# Patient Record
Sex: MALE | Race: BLACK OR AFRICAN AMERICAN | Employment: FULL TIME | URBAN - METROPOLITAN AREA
[De-identification: names, ages, dates, MRNs, and addresses within clinical notes are randomized per-mention and may not be internally consistent; named-entity substitution may affect disease eponyms.]

---

## 2024-01-31 ENCOUNTER — OFFICE VISIT (OUTPATIENT)
Dept: WOUND CARE | Facility: HOSPITAL | Age: 24
End: 2024-01-31
Attending: INTERNAL MEDICINE
Payer: COMMERCIAL

## 2024-01-31 VITALS
WEIGHT: 137 LBS | TEMPERATURE: 98 F | HEART RATE: 71 BPM | DIASTOLIC BLOOD PRESSURE: 76 MMHG | HEIGHT: 71 IN | SYSTOLIC BLOOD PRESSURE: 117 MMHG | BODY MASS INDEX: 19.18 KG/M2
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
## 2024-01-31 DIAGNOSIS — S31.109A OPEN WOUND OF ABDOMEN, INITIAL ENCOUNTER: Primary | ICD-10-CM

## 2024-01-31 DIAGNOSIS — T81.89XA NONHEALING SURGICAL WOUND, INITIAL ENCOUNTER: ICD-10-CM

## 2024-01-31 DIAGNOSIS — S21.109A OPEN WOUND OF CHEST WALL, UNSPECIFIED LATERALITY, INITIAL ENCOUNTER: ICD-10-CM

## 2024-01-31 PROCEDURE — 97597 DBRDMT OPN WND 1ST 20 CM/<: CPT | Performed by: INTERNAL MEDICINE

## 2024-01-31 PROCEDURE — 99214 OFFICE O/P EST MOD 30 MIN: CPT

## 2024-01-31 NOTE — PROGRESS NOTES
Weekly Wound Education Note    Teaching Provided To: Patient  Training Topics: Cleasing and general instructions;Dressing;Discharge instructions  Training Method: Explain/Verbal  Training Response: Patient responds and understands;Reinforcement needed        Notes: Initial visitL chest and abdominal wound. Honey gel, silver alginate, bordered gauze. PT/family requesting HH - orders faxed to [facility].

## 2024-01-31 NOTE — PATIENT INSTRUCTIONS
Wound Cleaning and Dressings:    Wash your hands with soap and water. Always wear gloves while changing dressings. Donot touch wound / jaaynt-wound skin with un-gloved hands. Remove old dressing, discard and place into trash.    DRESSINGS: honey gel / silver alginate.   Change dressing daily.    Miscellaneous Instructions:  Supplement with a daily multivitamin   Increase protein intake / consider protein supplements - see below    DIETARY MODIFICATIONS TO HELP WITH WOUND HEALING:    Protein: Meats, beans, eggs, milk and yogurt particularly Greek yogurt), tofu, soy nuts, soy protein products    Vitamin C: Citrus fruits and juices, strawberries, tomatoes, tomato juice, peppers, baked potatoes, spinach, broccoli, cauliflower, [place] sprouts, cabbage    Vitamin A: Dark green, leafy vegetables, orange or yellow vegetables, cantaloupe, fortified dairy products, liver, fortified cereals    Zinc: Fortified cereals, red meats, seafood    Consider Leon by [employer] (These are essential branch chain amino acids that help with tissue building and wound healing) and take 2 packets/day. you can order online at [employer] or [employer]    ADDITIONAL REMINDERS:    The treatment plan has been discussed at length with you and your provider. Follow all instructions carefully, it is very important. If you do not follow all instructions, you are at  risk of your wound not healing, infection, possible loss of limb and even end of life.  Please call the clinic during regular business hours ( 7:30 AM - 5:30 PM) if you notice increased bleeding, redness, warmth, pain or pus like drainage or start running a fever greater than 100.3.    For after hour emergencies, please call your primary physician or go to the nearest emergency room.

## 2024-01-31 NOTE — PROGRESS NOTES
Patient ID: Viji Quezada is a 23 year old male.    Debridement Traumatic Lower Sternum   Wound 01/31/24 #1- Chest Sternum Lower    Performed by: Kaleigh Dean MD  Authorized by: Kaleigh Dean MD      Consent   Consent obtained? verbal  Consent given by: patient  Risks discussed? procedural risks discussed    Debridement Details  Performed by: physician  Debridement type: conservative sharp  Pain control: lidocaine 4%  Pain control administration type: topical    Pre-debridement measurements  Length (cm): 1.5  Width (cm): 2.5  Depth (cm): 0.3  Surface Area (cm^2): 3.75    Post-debridement measurements  Length (cm): 1.5  Width (cm): 2.5  Depth (cm): 0.3  Percent debrided: 50%  Surface Area (cm^2): 3.75  Area Debrided (cm^2): 1.88  Volume (cm^3): 1.13    Devitalized tissue debrided: biofilm and slough  Instrument(s) utilized: curette  Bleeding: small  Hemostasis obtained with: pressure  Procedural pain (0-10): 4  Post-procedural pain: 0   Response to treatment: procedure was tolerated well
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
## 2024-02-07 ENCOUNTER — OFFICE VISIT (OUTPATIENT)
Dept: WOUND CARE | Facility: HOSPITAL | Age: 24
End: 2024-02-07
Attending: INTERNAL MEDICINE
Payer: COMMERCIAL

## 2024-02-07 VITALS
DIASTOLIC BLOOD PRESSURE: 62 MMHG | SYSTOLIC BLOOD PRESSURE: 118 MMHG | RESPIRATION RATE: 14 BRPM | TEMPERATURE: 99 F | HEART RATE: 71 BPM

## 2024-02-07 DIAGNOSIS — S31.109D OPEN WOUND OF ABDOMEN, SUBSEQUENT ENCOUNTER: Primary | ICD-10-CM

## 2024-02-07 DIAGNOSIS — T81.89XD NON-HEALING SURGICAL WOUND, SUBSEQUENT ENCOUNTER: ICD-10-CM

## 2024-02-07 DIAGNOSIS — S21.102D OPEN WOUND OF LEFT CHEST WALL, SUBSEQUENT ENCOUNTER: ICD-10-CM

## 2024-02-07 PROCEDURE — 99213 OFFICE O/P EST LOW 20 MIN: CPT

## 2024-02-07 NOTE — PROGRESS NOTES
[facility] PROGRESS NOTE  JUDITH ROBINS MD  2/7/2024    Chief Complaint:   Chief Complaint   Patient presents with    Wound Care     Follow up for abdominal wounds. No complains at this time.        HPI:   Jess Quezada is a 23 year old male coming in for a follow-up visit.    HPI    Wound much improved.  No s/o infection.   Healing as expected  More granulation    Review of Systems  Negative except HPI   Denies chest pain / SOB / palpitations  Denies fever.     Allergies  Not on File    Current Meds:  No current outpatient medications on file.         EXAM:   Objective   Objective    Physical Exam    Vital Signs  Vitals:    02/07/24 0818   BP: 118/62   Pulse: 71   Resp: 14   Temp: 98.5 °F (36.9 °C)       Wound Assessment  Wound 01/31/24 #1- Chest Sternum Lower (Active)   Wound Image   02/07/24 0823   Drainage Amount Small 02/07/24 0823   Drainage Description Yellow;Serous 02/07/24 0823   Wound Length (cm) 1 cm 02/07/24 0823   Wound Width (cm) 2.2 cm 02/07/24 0823   Wound Surface Area (cm^2) 2.2 cm^2 02/07/24 0823   Wound Depth (cm) 0.1 cm 02/07/24 0823   Wound Volume (cm^3) 0.22 cm^3 02/07/24 0823   Wound Healing % 80 02/07/24 0823   Margins Well-defined edges 02/07/24 0823   Non-staged Wound Description Full thickness 02/07/24 0823   Emely-wound Assessment Edema;Induration 02/07/24 0823   Wound Granulation Tissue Red;Spongy 02/07/24 0823   Wound Bed Granulation (%) 100 % 02/07/24 0823   Wound Bed Slough (%) 25 % 01/31/24 1505   Wound Odor None 02/07/24 0823       Wound 01/31/24 #2- Abdomen Abdomen Medial;Upper (Active)   Wound Image   02/07/24 0823   Drainage Amount Small 02/07/24 0823   Drainage Description Serous;Yellow 02/07/24 0823   Wound Length (cm) 9.3 cm 02/07/24 0823   Wound Width (cm) 0.5 cm 02/07/24 0823   Wound Surface Area (cm^2) 4.65 cm^2 02/07/24 0823   Wound Depth (cm) 0.1 cm 02/07/24 0823   Wound Volume (cm^3) 0.465 cm^3 02/07/24 0823   Wound Healing % 89 02/07/24 0823    Margins Well-defined edges 02/07/24 0823   Non-staged Wound Description Full thickness 02/07/24 0823   Jayant-wound Assessment Dry;Edema 02/07/24 0823   Wound Bed Granulation (%) 70 % 02/07/24 0823   Wound Bed Epithelium (%) 20 % 02/07/24 0823   Wound Bed Slough (%) 10 % 02/07/24 0823   Wound Odor None 02/07/24 0823   Shape Bridged 02/07/24 0823           ASSESSMENT AND PLAN:     Assessment   Assessment    Encounter Diagnosis  1. Open wound of abdomen, subsequent encounter    2. Open wound of left chest wall, subsequent encounter    3. Non-healing surgical wound, subsequent encounter        Problem List  There is no problem list on file for this patient.        MANAGEMENT    Dc honey gel  Start collagen and HF transfer  Protein shakes.   Avoid activities that raise intra abdominal pressure.     PROCEDURES:    Mechanical debridement of wound base with moist gauze    Patient Instructions     Wound Cleaning and Dressings:    Wash your hands with soap and water. Always wear gloves while changing dressings. Donot touch wound / jayant-wound skin with un-gloved hands. Remove old dressing, discard and place into trash.    DRESSINGS: collagen / HF transfer  Change dressing every other day MWF    Miscellaneous Instructions:  Supplement with a daily multivitamin   Increase protein intake / consider protein supplements - see below    DIETARY MODIFICATIONS TO HELP WITH WOUND HEALING:    Protein: Meats, beans, eggs, milk and yogurt particularly Greek yogurt), tofu, soy nuts, soy protein products    Vitamin C: Citrus fruits and juices, strawberries, tomatoes, tomato juice, peppers, baked potatoes, spinach, broccoli, cauliflower, [place] sprouts, cabbage    Vitamin A: Dark green, leafy vegetables, orange or yellow vegetables, cantaloupe, fortified dairy products, liver, fortified cereals    Zinc: Fortified cereals, red meats, seafood    Consider Leon by [employer] (These are essential branch chain amino acids that help with tissue  building and wound healing) and take 2 packets/day. you can order online at [employer] or "[employer]    ADDITIONAL REMINDERS:    The treatment plan has been discussed at length with you and your provider. Follow all instructions carefully, it is very important. If you do not follow all instructions, you are at  risk of your wound not healing, infection, possible loss of limb and even end of life.  Please call the clinic during regular business hours ( 7:30 AM - 5:30 PM) if you notice increased bleeding, redness, warmth, pain or pus like drainage or start running a fever greater than 100.3.    For after hour emergencies, please call your primary physician or go to the nearest emergency room.      Orders  No orders of the defined types were placed in this encounter.      Meds & Refills for this Visit:  Requested Prescriptions      No prescriptions requested or ordered in this encounter         Patient/Caregiver Education: There are no barriers to learning. Medical education for above diagnosis given.   Answered all questions.    Outcome: Patient verbalizes understanding. Patient is notified to call with any questions, complications, allergies, or worsening or changing symptoms.  Patient is to call with any side effects or complications as a result of the treatments today.      DOCUMENTATION OF TIME SPENT: Code selection for this visit was based on time spent : 35 min on date of service in preparing to see the patient, obtaining and/or reviewing separately obtained history, performing a medically appropriate examination, counseling and educating the patient/family/caregiver, ordering medications or testing, referring and communicating with other healthcare providers, documenting clinical information in the E HR, independently interpreting results and communicating results to the patient/family/caregiver and care coordination with the patient's other providers.    Followup: Return in about 5 days (around 2/12/2024) for Wound  followup.      Note to Patient:  The 21st Century Cures Act makes medical notes like these available to patients in the interest of transparency. However, be advised this is a medical document and is intended as iflr-ch-pqfy communication; it is written in medical language and may appear blunt, direct, or contain abbreviations or verbiage that are unfamiliar. Medical documents are intended to carry relevant information, facts as evident, and the clinical opinion of the practitioner.    Also, please note that this report has been produced using speech recognition software and may contain errors related to that system including, but not limited to, errors in grammar, punctuation, and spelling, as well as words and phrases that possibly may have been recognized inappropriately.  If there are any questions or concerns, contact the dictating provider for clarification.      Kaleigh Melgar MD  2/7/2024  8:43 AM

## 2024-02-07 NOTE — PROGRESS NOTES
Weekly Wound Education Note    Teaching Provided To: Patient;Family  Training Topics: Cleasing and general instructions;Dressing;Discharge instructions  Training Method: Explain/Verbal  Training Response: Patient responds and understands;Reinforcement needed        Notes: Improving. Switched to endoform, hydrofera transfer, bordered gauze. Pt states HH did not go, father did dressing changes daily. Educated them on new dressings and provided with supplies. Provider recommended abdominal binder, provider with small abd. binder - pt states \"it feels good.\"

## 2024-02-07 NOTE — PATIENT INSTRUCTIONS
Wound Cleaning and Dressings:    Wash your hands with soap and water. Always wear gloves while changing dressings. Donot touch wound / jayant-wound skin with un-gloved hands. Remove old dressing, discard and place into trash.    DRESSINGS: collagen / HF transfer  Change dressing every other day MWF    Miscellaneous Instructions:  Supplement with a daily multivitamin   Increase protein intake / consider protein supplements - see below    DIETARY MODIFICATIONS TO HELP WITH WOUND HEALING:    Protein: Meats, beans, eggs, milk and yogurt particularly Greek yogurt), tofu, soy nuts, soy protein products    Vitamin C: Citrus fruits and juices, strawberries, tomatoes, tomato juice, peppers, baked potatoes, spinach, broccoli, cauliflower, [place] sprouts, cabbage    Vitamin A: Dark green, leafy vegetables, orange or yellow vegetables, cantaloupe, fortified dairy products, liver, fortified cereals    Zinc: Fortified cereals, red meats, seafood    Consider Leon by [employer] (These are essential branch chain amino acids that help with tissue building and wound healing) and take 2 packets/day. you can order online at [employer] or [facility]    ADDITIONAL REMINDERS:    The treatment plan has been discussed at length with you and your provider. Follow all instructions carefully, it is very important. If you do not follow all instructions, you are at  risk of your wound not healing, infection, possible loss of limb and even end of life.  Please call the clinic during regular business hours ( 7:30 AM - 5:30 PM) if you notice increased bleeding, redness, warmth, pain or pus like drainage or start running a fever greater than 100.3.    For after hour emergencies, please call your primary physician or go to the nearest emergency room.

## 2024-02-12 ENCOUNTER — OFFICE VISIT (OUTPATIENT)
Dept: WOUND CARE | Facility: HOSPITAL | Age: 24
End: 2024-02-12
Attending: INTERNAL MEDICINE
Payer: COMMERCIAL

## 2024-02-12 VITALS
DIASTOLIC BLOOD PRESSURE: 78 MMHG | HEART RATE: 69 BPM | SYSTOLIC BLOOD PRESSURE: 117 MMHG | RESPIRATION RATE: 16 BRPM | TEMPERATURE: 98 F

## 2024-02-12 DIAGNOSIS — S21.102D OPEN WOUND OF LEFT CHEST WALL, SUBSEQUENT ENCOUNTER: ICD-10-CM

## 2024-02-12 DIAGNOSIS — T81.89XD NON-HEALING SURGICAL WOUND, SUBSEQUENT ENCOUNTER: ICD-10-CM

## 2024-02-12 DIAGNOSIS — S31.109D OPEN WOUND OF ABDOMEN, SUBSEQUENT ENCOUNTER: Primary | ICD-10-CM

## 2024-02-12 PROCEDURE — 97597 DBRDMT OPN WND 1ST 20 CM/<: CPT | Performed by: INTERNAL MEDICINE

## 2024-02-12 NOTE — PROGRESS NOTES
[facility] PROGRESS NOTE  JUDITH ROBINS MD  2/12/2024    Chief Complaint:   Chief Complaint   Patient presents with    Wound Care     Patient is here for a wound care follow up. He denies any pain or new wound concerns.       HPI:   Subjective   Viji Quezada is a 23 year old male coming in for a follow-up visit.    HPI    Wound improved  Abdominal wound closed  Chest wound improved dimensions  No s/o infection.     Review of Systems  Negative except HPI   Denies chest pain / SOB / palpitations  Denies fever.     Allergies  Not on File    Current Meds:  No current outpatient medications on file.         EXAM:   Objective   Objective    Physical Exam    Vital Signs  Vitals:    02/12/24 0900   BP: 117/78   Pulse: 69   Resp: 16   Temp: 97.7 °F (36.5 °C)       Wound Assessment  Wound 01/31/24 #1- Chest Sternum Lower (Active)   Wound Image   02/12/24 0904   Drainage Amount Scant 02/12/24 0904   Drainage Description Yellow;Serous 02/12/24 0904   Wound Length (cm) 1.1 cm 02/12/24 0904   Wound Width (cm) 0.6 cm 02/12/24 0904   Wound Surface Area (cm^2) 0.66 cm^2 02/12/24 0904   Wound Depth (cm) 0.1 cm 02/12/24 0904   Wound Volume (cm^3) 0.066 cm^3 02/12/24 0904   Wound Healing % 94 02/12/24 0904   Margins Well-defined edges 02/12/24 0904   Non-staged Wound Description Full thickness 02/12/24 0904   Emely-wound Assessment Edema;Induration 02/12/24 0904   Wound Granulation Tissue Spongy;Pink 02/12/24 0904   Wound Bed Granulation (%) 100 % 02/12/24 0904   Wound Bed Slough (%) 25 % 01/31/24 1505   Wound Odor None 02/12/24 0904   Tunneling? No 02/12/24 0904   Undermining? No 02/12/24 0904   Sinus Tracts? No 02/12/24 0904       Wound 01/31/24 #2- Abdomen Abdomen Medial;Upper (Active)   Wound Image   02/12/24 0905   Drainage Amount None 02/12/24 0905   Drainage Description Serous;Yellow 02/07/24 0823   Wound Length (cm) 0 cm 02/12/24 0905   Wound Width (cm) 0 cm 02/12/24 0905   Wound Surface Area (cm^2) 0 cm^2 02/12/24  0905   Wound Depth (cm) 0 cm 02/12/24 0905   Wound Volume (cm^3) 0 cm^3 02/12/24 0905   Wound Healing % 100 02/12/24 0905   Margins Flat and Intact 02/12/24 0905   Non-staged Wound Description Full thickness 02/07/24 0823   Emely-wound Assessment Dry;Clean 02/12/24 0905   Wound Bed Granulation (%) 70 % 02/07/24 0823   Wound Bed Epithelium (%) 100 % 02/12/24 0905   Wound Bed Slough (%) 10 % 02/07/24 0823   Wound Odor None 02/12/24 0905   Shape Bridged 02/07/24 0823   Tunneling? No 02/12/24 0905   Undermining? No 02/12/24 0905   Sinus Tracts? No 02/12/24 0905           ASSESSMENT AND PLAN:     Assessment   Assessment    Encounter Diagnosis  1. Open wound of abdomen, subsequent encounter    2. Open wound of left chest wall, subsequent encounter    3. Non-healing surgical wound, subsequent encounter        MANAGEMENT    Abdomen wound fragilely healed, will protect with strips of thin foam. Continue endoform, hydrofera transfer, border gauze to chest wound. Extra supplies given. Will continue to wear abdominal binder.     PROCEDURES:    Debridement Traumatic Lower Sternum   Wound 01/31/24 #1- Chest Sternum Lower     Performed by: Kaleigh Dean MD  Authorized by: Kaleigh Dean MD       Consent   Consent obtained? verbal  Consent given by: patient     Debridement Details  Performed by: physician  Debridement type: conservative sharp  Pain control: lidocaine 4%  Pain control administration type: topical     Pre-debridement measurements  Length (cm): 1.1  Width (cm): 0.6  Depth (cm): 0.1  Surface Area (cm^2): 0.66     Post-debridement measurements  Length (cm): 1.1  Width (cm): 0.6  Depth (cm): 0.2  Percent debrided: 100%  Surface Area (cm^2): 0.66  Area Debrided (cm^2): 0.66  Volume (cm^3): 0.13     Devitalized tissue debrided: biofilm and slough  Instrument(s) utilized: curette  Bleeding: small  Hemostasis obtained with: pressure  Procedural pain (0-10): 0  Post-procedural pain: 0   Response to treatment:  procedure was tolerated well         Patient Instructions     Wound Cleaning and Dressings:    Wash your hands with soap and water. Always wear gloves while changing dressings. Donot touch wound / jayant-wound skin with un-gloved hands. Remove old dressing, discard and place into trash.    DRESSINGS: collagen / HF transfer  Change dressing every other day MWF    Miscellaneous Instructions:  Supplement with a daily multivitamin   Increase protein intake / consider protein supplements - see below    DIETARY MODIFICATIONS TO HELP WITH WOUND HEALING:    Protein: Meats, beans, eggs, milk and yogurt particularly Greek yogurt), tofu, soy nuts, soy protein products    Vitamin C: Citrus fruits and juices, strawberries, tomatoes, tomato juice, peppers, baked potatoes, spinach, broccoli, cauliflower, [place] sprouts, cabbage    Vitamin A: Dark green, leafy vegetables, orange or yellow vegetables, cantaloupe, fortified dairy products, liver, fortified cereals    Zinc: Fortified cereals, red meats, seafood    Consider Leon by [employer] (These are essential branch chain amino acids that help with tissue building and wound healing) and take 2 packets/day. you can order online at [employer] or [employer]    ADDITIONAL REMINDERS:    The treatment plan has been discussed at length with you and your provider. Follow all instructions carefully, it is very important. If you do not follow all instructions, you are at  risk of your wound not healing, infection, possible loss of limb and even end of life.  Please call the clinic during regular business hours ( 7:30 AM - 5:30 PM) if you notice increased bleeding, redness, warmth, pain or pus like drainage or start running a fever greater than 100.3.    For after hour emergencies, please call your primary physician or go to the nearest emergency room.    Orders  Orders Placed This Encounter   Procedures    Debridement Traumatic Lower Sternum     Patient/Caregiver Education: There are no barriers  to learning. Medical education for above diagnosis given.   Answered all questions.    Outcome: Patient verbalizes understanding. Patient is notified to call with any questions, complications, allergies, or worsening or changing symptoms.  Patient is to call with any side effects or complications as a result of the treatments today.      DOCUMENTATION OF TIME SPENT: Code selection for this visit was based on time spent : 35 min on date of service in preparing to see the patient, obtaining and/or reviewing separately obtained history, performing a medically appropriate examination, counseling and educating the patient/family/caregiver, ordering medications or testing, referring and communicating with other healthcare providers, documenting clinical information in the E HR, independently interpreting results and communicating results to the patient/family/caregiver and care coordination with the patient's other providers.    Followup: Return in about 1 week (around 2/19/2024) for Wound followup.      Note to Patient:  The 21st Century Cures Act makes medical notes like these available to patients in the interest of transparency. However, be advised this is a medical document and is intended as [id number] communication; it is written in medical language and may appear blunt, direct, or contain abbreviations or verbiage that are unfamiliar. Medical documents are intended to carry relevant information, facts as evident, and the clinical opinion of the practitioner.    Also, please note that this report has been produced using speech recognition software and may contain errors related to that system including, but not limited to, errors in grammar, punctuation, and spelling, as well as words and phrases that possibly may have been recognized inappropriately.  If there are any questions or concerns, contact the dictating provider for clarification.      Kaleigh Melgar MD  2/12/2024  9:14 AM

## 2024-02-12 NOTE — PATIENT INSTRUCTIONS
Wound Cleaning and Dressings:    Wash your hands with soap and water. Always wear gloves while changing dressings. Donot touch wound / jayant-wound skin with un-gloved hands. Remove old dressing, discard and place into trash.    DRESSINGS: collagen / HF transfer  Change dressing every other day MWF    Miscellaneous Instructions:  Supplement with a daily multivitamin   Increase protein intake / consider protein supplements - see below    DIETARY MODIFICATIONS TO HELP WITH WOUND HEALING:    Protein: Meats, beans, eggs, milk and yogurt particularly Greek yogurt), tofu, soy nuts, soy protein products    Vitamin C: Citrus fruits and juices, strawberries, tomatoes, tomato juice, peppers, baked potatoes, spinach, broccoli, cauliflower, [place] sprouts, cabbage    Vitamin A: Dark green, leafy vegetables, orange or yellow vegetables, cantaloupe, fortified dairy products, liver, fortified cereals    Zinc: Fortified cereals, red meats, seafood    Consider Leon by [employer] (These are essential branch chain amino acids that help with tissue building and wound healing) and take 2 packets/day. you can order online at [employer] or [employer]    ADDITIONAL REMINDERS:    The treatment plan has been discussed at length with you and your provider. Follow all instructions carefully, it is very important. If you do not follow all instructions, you are at  risk of your wound not healing, infection, possible loss of limb and even end of life.  Please call the clinic during regular business hours ( 7:30 AM - 5:30 PM) if you notice increased bleeding, redness, warmth, pain or pus like drainage or start running a fever greater than 100.3.    For after hour emergencies, please call your primary physician or go to the nearest emergency room.

## 2024-02-12 NOTE — PROGRESS NOTES
.Weekly Wound Education Note    Teaching Provided To: Patient  Training Topics: Dressing;Compression;Discharge instructions;Cleasing and general instructions;Nutrition  Training Method: Explain/Verbal;Written  Training Response: Patient responds and understands            Abdomen wound fragilely healed, will protect with strips of thin foam.  Continue endoform, hydrofera transfer, border gauze to chest wound.  Extra supplies given.  Will continue to wear abdominal binder.

## 2024-02-12 NOTE — PROGRESS NOTES
Patient ID: Viji Quezada is a 23 year old male.    Debridement Traumatic Lower Sternum   Wound 01/31/24 #1- Chest Sternum Lower    Performed by: Kaleigh Dean MD  Authorized by: Kaleigh Dean MD      Consent   Consent obtained? verbal  Consent given by: patient    Debridement Details  Performed by: physician  Debridement type: conservative sharp  Pain control: lidocaine 4%  Pain control administration type: topical    Pre-debridement measurements  Length (cm): 1.1  Width (cm): 0.6  Depth (cm): 0.1  Surface Area (cm^2): 0.66    Post-debridement measurements  Length (cm): 1.1  Width (cm): 0.6  Depth (cm): 0.2  Percent debrided: 100%  Surface Area (cm^2): 0.66  Area Debrided (cm^2): 0.66  Volume (cm^3): 0.13    Devitalized tissue debrided: biofilm and slough  Instrument(s) utilized: curette  Bleeding: small  Hemostasis obtained with: pressure  Procedural pain (0-10): 0  Post-procedural pain: 0   Response to treatment: procedure was tolerated well

## 2024-02-19 ENCOUNTER — APPOINTMENT (OUTPATIENT)
Dept: WOUND CARE | Facility: HOSPITAL | Age: 24
End: 2024-02-19
Attending: INTERNAL MEDICINE
Payer: COMMERCIAL

## 2024-02-19 ENCOUNTER — TELEPHONE (OUTPATIENT)
Dept: WOUND CARE | Facility: HOSPITAL | Age: 24
End: 2024-02-19
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
